# Patient Record
Sex: FEMALE | Race: WHITE | Employment: UNEMPLOYED | URBAN - METROPOLITAN AREA
[De-identification: names, ages, dates, MRNs, and addresses within clinical notes are randomized per-mention and may not be internally consistent; named-entity substitution may affect disease eponyms.]

---
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
## 2024-03-25 ENCOUNTER — HOSPITAL ENCOUNTER (OUTPATIENT)
Age: 19
Setting detail: SPECIMEN
Discharge: HOME | End: 2024-03-25

## 2024-03-27 LAB
A ALTERNATA IGE QN: 13.7 KU/L (ref 0–0.34)
A FUMIGATUS IGE QN: 5.75 KU/L (ref 0–0.34)
ALLERGEN BIRCH IGE: <0.1 KU/L (ref 0–0.34)
BERMUDA GRASS IGE QN: <0.1 KU/L (ref 0–0.34)
BOXELDER IGE QN: <0.1 KU/L (ref 0–0.34)
C HERBARUM IGE QN: 3.29 KUL/L (ref 0–0.34)
CALIF WALNUT POLN IGE QN: 0.13 KU/L (ref 0–0.34)
CAT DANDER IGE QN: 27.9 KU/L (ref 0–0.34)
CMN PIGWEED IGE QN: <0.1 KU/L (ref 0–0.34)
COMMON RAGWEED IGE QN: 0.12 KU/L (ref 0–0.34)
COTTONWOOD IGE QN: <0.1 KU/L (ref 0–0.34)
D FARINAE IGE QN: 0.31 KU/L (ref 0–0.34)
D PTERONYSS IGE QN: 0.5 KU/L (ref 0–0.34)
DOG DANDER IGE QN: 4.19 KU/L (ref 0–0.34)
IGE SERPL-ACNC: 280 IU/ML (ref 0–100)
LONDON PLANE IGE QN: <0.1 KU/L (ref 0–0.34)
M RACEMOSUS IGE QN: <0.1 KU/L (ref 0–0.34)
MOUSE EPITH IGE QN: <0.1 KU/L (ref 0–0.34)
MT JUNIPER IGE QN: 0.15 KU/L (ref 0–0.34)
P NOTATUM IGE QN: 1.32 KU/L (ref 0–0.34)
PECAN/HICK TREE IGE QN: <0.1 KU/L (ref 0–0.34)
ROACH IGE QN: 0.14 KU/L (ref 0–0.34)
SALTWORT IGE QN: <0.1 KU/L (ref 0–0.34)
SHEEP SORREL IGE QN: <0.1 KU/L (ref 0–0.34)
TIMOTHY IGE QN: <0.1 KU/L (ref 0–0.34)
WHITE ASH IGE QN: <0.1 KU/L (ref 0–0.34)
WHITE ELM IGE QN: <0.1 KU/L (ref 0–0.34)
WHITE MULBERRY IGE QN: <0.1 KU/L (ref 0–0.34)
WHITE OAK IGE QN: <0.1 KU/L (ref 0–0.34)

## 2024-03-29 LAB
BARLEY IGE QN: ABNORMAL KU/L (ref 0–0.34)
BEEF IGE QN: ABNORMAL KU/L (ref 0–0.34)
CABBAGE IGE QN: <0.1 KU/L (ref 0–0.34)
CARROT IGE QN: ABNORMAL KU/L (ref 0–0.34)
CHICKEN MEAT IGE QN: ABNORMAL KU/L (ref 0–0.34)
CODFISH IGE QN: ABNORMAL KU/L (ref 0–0.34)
CORN IGE QN: ABNORMAL KU/L (ref 0–0.34)
COW MILK IGE QN: ABNORMAL KU/L (ref 0–0.34)
CRAB IGE QN: 0.13 KU/L (ref 0–0.34)
EGG WHITE IGE QN: <0.1 KU/L (ref 0–0.34)
GRAPE IGE QN: ABNORMAL KU/L (ref 0–0.34)
IGE SERPL-ACNC: 280 IU/ML
LETTUCE IGE QN: <0.1 KU/L (ref 0–0.34)
OAT IGE QN: ABNORMAL KU/L (ref 0–0.34)
ORANGE TREE IGE QN: ABNORMAL KU/L (ref 0–0.34)
PAPRIKA IGE QN: <0.1 KU/L (ref 0–0.34)
PEANUT IGE QN: <0.1 KU/L (ref 0–0.34)
PORK IGE QN: ABNORMAL KU/L (ref 0–0.34)
POTATO IGE QN: ABNORMAL KU/L (ref 0–0.34)
RICE IGE QN: ABNORMAL KU/L (ref 0–0.34)
RYE IGE QN: ABNORMAL KU/L (ref 0–0.34)
SHRIMP IGE QN: 0.26 KU/L (ref 0–0.34)
SOYBEAN IGE QN: <0.1 KU/L (ref 0–0.34)
TOMATO IGE QN: ABNORMAL KU/L (ref 0–0.34)
TUNA IGE QN: ABNORMAL KU/L (ref 0–0.34)
WHEAT IGE QN: ABNORMAL KU/L (ref 0–0.34)
WHITE BEAN IGE QN: <0.1 KU/L (ref 0–0.34)

## 2024-04-05 ENCOUNTER — HOSPITAL ENCOUNTER (OUTPATIENT)
Age: 19
Setting detail: SPECIMEN
Discharge: HOME | End: 2024-04-05

## 2024-04-05 LAB
BAKER'S YEAST IGE QN: ABNORMAL KU/L (ref 0–0.34)
BANANA IGE QN: ABNORMAL KU/L (ref 0–0.34)
BEEF IGE QN: ABNORMAL KU/L (ref 0–0.34)
CASEIN IGE QN: ABNORMAL KU/L (ref 0–0.34)
CHICKEN MEAT IGE QN: ABNORMAL KU/L (ref 0–0.34)
CHOCOLATE IGE QN: ABNORMAL KU/L (ref 0–0.34)
CINNAMON IGE QN: ABNORMAL KU/L (ref 0–0.34)
CODFISH IGE QN: ABNORMAL KU/L (ref 0–0.34)
CORN IGE QN: ABNORMAL KU/L (ref 0–0.34)
COW MILK IGE QN: ABNORMAL KU/L (ref 0–0.34)
EGG WHITE IGE QN: ABNORMAL KU/L (ref 0–0.34)
GARLIC IGE QN: ABNORMAL KU/L (ref 0–0.34)
IGE SERPL-ACNC: 349 IU/ML (ref 0–100)
MUSTARD IGE QN: ABNORMAL KU/L (ref 0–0.34)
ORANGE TREE IGE QN: ABNORMAL KU/L (ref 0–0.34)
PEA IGE QN: ABNORMAL KU/L (ref 0–0.34)
PEANUT IGE QN: ABNORMAL KU/L (ref 0–0.34)
PORK IGE QN: ABNORMAL KU/L (ref 0–0.34)
POTATO IGE QN: ABNORMAL KU/L (ref 0–0.34)
RICE IGE QN: ABNORMAL KU/L (ref 0–0.34)
SCALLOP IGE QN: ABNORMAL KU/L (ref 0–0.34)
SHRIMP IGE QN: ABNORMAL KU/L (ref 0–0.34)
SOYBEAN IGE QN: ABNORMAL KU/L (ref 0–0.34)
STRAWBERRY IGE QN: ABNORMAL KU/L (ref 0–0.34)
TOMATO IGE QN: ABNORMAL KU/L (ref 0–0.34)
TUNA IGE QN: ABNORMAL KU/L (ref 0–0.34)
WALNUT IGE QN: ABNORMAL KU/L (ref 0–0.34)
WHEAT IGE QN: ABNORMAL KU/L (ref 0–0.34)
WHOLE EGG IGE QN: ABNORMAL KU/L (ref 0–0.34)

## 2024-04-10 LAB
BAKER'S YEAST IGE QN: 4.23 KU/L (ref 0–0.34)
BANANA IGE QN: 0.29 KU/L (ref 0–0.34)
BEEF IGE QN: 0.16 KU/L (ref 0–0.34)
CASEIN IGE QN: <0.1 KU/L (ref 0–0.34)
CHICKEN MEAT IGE QN: <0.1 KU/L (ref 0–0.34)
CHOCOLATE IGE QN: <0.1 KU/L (ref 0–0.34)
CINNAMON IGE QN: <0.1 KU/L (ref 0–0.34)
CODFISH IGE QN: <0.1 KU/L (ref 0–0.34)
CORN IGE QN: 0.15 KU/L (ref 0–0.34)
COW MILK IGE QN: 0.8 KU/L (ref 0–0.34)
EGG WHITE IGE QN: <0.1 KU/L (ref 0–0.34)
GARLIC IGE QN: 0.93 KU/L (ref 0–0.34)
IGE SERPL-ACNC: 349 IU/ML (ref 0–100)
MUSTARD IGE QN: 0.21 KU/L (ref 0–0.34)
ORANGE TREE IGE QN: <0.1 KU/L (ref 0–0.34)
PEA IGE QN: <0.1 KU/L (ref 0–0.34)
PEANUT IGE QN: <0.1 KU/L (ref 0–0.34)
PORK IGE QN: <0.1 KU/L (ref 0–0.34)
POTATO IGE QN: <0.1 KU/L (ref 0–0.34)
RICE IGE QN: <0.1 KU/L (ref 0–0.34)
SCALLOP IGE QN: 0.12 KU/L (ref 0–0.34)
SHRIMP IGE QN: 0.24 KU/L (ref 0–0.34)
SOYBEAN IGE QN: <0.1 KU/L (ref 0–0.34)
STRAWBERRY IGE QN: <0.1 KU/L (ref 0–0.34)
TOMATO IGE QN: <0.1 KU/L (ref 0–0.34)
TUNA IGE QN: <0.1 KU/L (ref 0–0.34)
WALNUT IGE QN: <0.1 KU/L (ref 0–0.34)
WHEAT IGE QN: 0.21 KU/L (ref 0–0.34)
WHOLE EGG IGE QN: <0.1 KU/L (ref 0–0.34)

## 2024-06-18 ENCOUNTER — HOSPITAL ENCOUNTER (OUTPATIENT)
Age: 19
Discharge: HOME | End: 2024-06-18
Payer: COMMERCIAL

## 2024-06-18 DIAGNOSIS — E03.9 ACQUIRED HYPOTHYROIDISM: ICD-10-CM

## 2024-06-18 LAB
T4 FREE SERPL-MCNC: 1.1 NG/DL (ref 0.92–1.68)
TSH SERPL DL<=0.05 MIU/L-ACNC: 4.51 UIU/ML (ref 0.27–4.2)

## 2024-06-18 PROCEDURE — 84443 ASSAY THYROID STIM HORMONE: CPT

## 2024-06-18 PROCEDURE — 36415 COLL VENOUS BLD VENIPUNCTURE: CPT

## 2024-06-18 PROCEDURE — 84439 ASSAY OF FREE THYROXINE: CPT

## 2024-06-19 DIAGNOSIS — E06.3 HYPOTHYROIDISM DUE TO HASHIMOTO'S THYROIDITIS: Primary | ICD-10-CM

## 2024-06-19 DIAGNOSIS — E03.8 HYPOTHYROIDISM DUE TO HASHIMOTO'S THYROIDITIS: Primary | ICD-10-CM

## 2024-06-19 NOTE — RESULT ENCOUNTER NOTE
Please let Malena know that her Thryoid labs look good, her TSH is slightly elevated but has improved since last check. I would like to recheck her labs in 6 weeks. I will mail labs reqs to the house.

## 2025-01-13 ENCOUNTER — HOSPITAL ENCOUNTER (OUTPATIENT)
Age: 20
Discharge: HOME | End: 2025-01-13
Payer: COMMERCIAL

## 2025-01-13 DIAGNOSIS — E03.9 ACQUIRED HYPOTHYROIDISM: ICD-10-CM

## 2025-01-13 LAB
T4 FREE SERPL-MCNC: 1.3 NG/DL (ref 0.92–1.68)
TSH SERPL DL<=0.05 MIU/L-ACNC: 3.11 UIU/ML (ref 0.27–4.2)

## 2025-01-13 PROCEDURE — 36415 COLL VENOUS BLD VENIPUNCTURE: CPT

## 2025-01-13 PROCEDURE — 84439 ASSAY OF FREE THYROXINE: CPT

## 2025-01-13 PROCEDURE — 84443 ASSAY THYROID STIM HORMONE: CPT

## 2025-07-18 ENCOUNTER — HOSPITAL ENCOUNTER (OUTPATIENT)
Age: 20
Setting detail: SPECIMEN
Discharge: HOME | End: 2025-07-18

## 2025-07-18 DIAGNOSIS — E03.9 ACQUIRED HYPOTHYROIDISM: ICD-10-CM

## 2025-07-18 LAB
T4 FREE SERPL-MCNC: 1 NG/DL (ref 0.92–1.68)
TSH SERPL DL<=0.05 MIU/L-ACNC: 4.52 UIU/ML (ref 0.27–4.2)